# Patient Record
Sex: FEMALE | Race: WHITE | NOT HISPANIC OR LATINO | Employment: OTHER | URBAN - METROPOLITAN AREA
[De-identification: names, ages, dates, MRNs, and addresses within clinical notes are randomized per-mention and may not be internally consistent; named-entity substitution may affect disease eponyms.]

---

## 2023-10-26 PROBLEM — Z90.710 S/P TAH-BSO (TOTAL ABDOMINAL HYSTERECTOMY AND BILATERAL SALPINGO-OOPHORECTOMY): Status: ACTIVE | Noted: 2021-03-24

## 2023-10-26 PROBLEM — E78.00 PURE HYPERCHOLESTEROLEMIA, UNSPECIFIED: Status: ACTIVE | Noted: 2020-02-03

## 2023-10-26 PROBLEM — U07.1 COVID-19: Status: ACTIVE | Noted: 2022-09-16

## 2023-10-26 PROBLEM — K92.1 HEMATOCHEZIA: Status: ACTIVE | Noted: 2023-10-26

## 2023-10-26 PROBLEM — E78.5 HYPERLIPEMIA: Status: ACTIVE | Noted: 2020-02-03

## 2023-10-26 PROBLEM — R82.90 ABNORMAL URINE: Status: ACTIVE | Noted: 2022-03-25

## 2023-10-26 PROBLEM — E86.0 DEHYDRATION: Status: ACTIVE | Noted: 2023-10-26

## 2023-10-26 PROBLEM — R11.2 NAUSEA & VOMITING: Status: ACTIVE | Noted: 2023-10-26

## 2023-10-26 PROBLEM — R50.9 FEVER: Status: ACTIVE | Noted: 2021-10-15

## 2023-10-26 PROBLEM — L65.9 ALOPECIA: Status: ACTIVE | Noted: 2021-03-24

## 2023-10-26 PROBLEM — R31.9 HEMATURIA: Status: ACTIVE | Noted: 2019-11-04

## 2023-10-26 PROBLEM — K76.0 FATTY LIVER: Status: ACTIVE | Noted: 2020-02-03

## 2023-10-26 PROBLEM — E55.9 VITAMIN D DEFICIENCY: Status: ACTIVE | Noted: 2021-03-24

## 2023-10-26 PROBLEM — R10.9 ABDOMINAL PAIN IN FEMALE: Status: ACTIVE | Noted: 2019-11-04

## 2023-10-26 PROBLEM — E87.5 HYPERKALEMIA: Status: ACTIVE | Noted: 2022-04-07

## 2023-10-26 PROBLEM — K52.9 GASTROENTERITIS, ACUTE: Status: ACTIVE | Noted: 2023-10-26

## 2023-10-26 PROBLEM — E83.19: Status: ACTIVE | Noted: 2021-03-24

## 2023-10-26 PROBLEM — Z90.722 S/P TAH-BSO (TOTAL ABDOMINAL HYSTERECTOMY AND BILATERAL SALPINGO-OOPHORECTOMY): Status: ACTIVE | Noted: 2021-03-24

## 2023-10-26 PROBLEM — E04.1 THYROID NODULE: Status: ACTIVE | Noted: 2020-02-03

## 2023-10-26 PROBLEM — E04.9 GOITER: Status: ACTIVE | Noted: 2021-03-24

## 2023-10-26 PROBLEM — R05.9 COUGH: Status: ACTIVE | Noted: 2021-10-15

## 2023-10-26 PROBLEM — K80.10 CALCULUS OF GALLBLADDER WITH CHOLECYSTITIS: Status: ACTIVE | Noted: 2023-10-26

## 2023-10-26 PROBLEM — Q12.0: Status: ACTIVE | Noted: 2021-03-24

## 2023-10-26 PROBLEM — N39.0 URINARY TRACT INFECTION: Status: ACTIVE | Noted: 2021-11-18

## 2023-10-26 PROBLEM — E53.8 COBALAMIN DEFICIENCY: Status: ACTIVE | Noted: 2020-02-03

## 2023-10-26 PROBLEM — Z90.79 S/P TAH-BSO (TOTAL ABDOMINAL HYSTERECTOMY AND BILATERAL SALPINGO-OOPHORECTOMY): Status: ACTIVE | Noted: 2021-03-24

## 2023-10-26 RX ORDER — METHYLPREDNISOLONE 4 MG/1
TABLET ORAL
COMMUNITY
Start: 2016-10-16 | End: 2023-10-30 | Stop reason: ALTCHOICE

## 2023-10-26 RX ORDER — ROSUVASTATIN CALCIUM 5 MG/1
5 TABLET, COATED ORAL DAILY
COMMUNITY
Start: 2023-06-22 | End: 2023-12-21

## 2023-10-26 RX ORDER — DOXYCYCLINE 100 MG/1
100 CAPSULE ORAL EVERY 12 HOURS
COMMUNITY
Start: 2016-10-16 | End: 2023-10-30 | Stop reason: ALTCHOICE

## 2023-10-26 RX ORDER — ONDANSETRON 4 MG/1
4 TABLET, ORALLY DISINTEGRATING ORAL 3 TIMES DAILY PRN
COMMUNITY
Start: 2021-10-16 | End: 2023-10-30 | Stop reason: ALTCHOICE

## 2023-10-26 RX ORDER — COLESEVELAM 180 1/1
1875 TABLET ORAL
COMMUNITY
Start: 2019-11-04

## 2023-10-26 RX ORDER — CEPHALEXIN 500 MG/1
500 CAPSULE ORAL 2 TIMES DAILY
COMMUNITY
End: 2023-10-30 | Stop reason: ALTCHOICE

## 2023-10-30 ENCOUNTER — OFFICE VISIT (OUTPATIENT)
Dept: PRIMARY CARE | Facility: CLINIC | Age: 68
End: 2023-10-30
Payer: MEDICARE

## 2023-10-30 VITALS
DIASTOLIC BLOOD PRESSURE: 86 MMHG | HEIGHT: 64 IN | HEART RATE: 88 BPM | OXYGEN SATURATION: 96 % | SYSTOLIC BLOOD PRESSURE: 142 MMHG | BODY MASS INDEX: 32.44 KG/M2 | WEIGHT: 190 LBS | TEMPERATURE: 97.3 F

## 2023-10-30 DIAGNOSIS — J20.8 ACUTE BRONCHITIS DUE TO OTHER SPECIFIED ORGANISMS: Primary | ICD-10-CM

## 2023-10-30 PROCEDURE — 1159F MED LIST DOCD IN RCRD: CPT | Performed by: NURSE PRACTITIONER

## 2023-10-30 PROCEDURE — 1126F AMNT PAIN NOTED NONE PRSNT: CPT | Performed by: NURSE PRACTITIONER

## 2023-10-30 PROCEDURE — 99213 OFFICE O/P EST LOW 20 MIN: CPT | Performed by: NURSE PRACTITIONER

## 2023-10-30 PROCEDURE — 1036F TOBACCO NON-USER: CPT | Performed by: NURSE PRACTITIONER

## 2023-10-30 RX ORDER — ACETAMINOPHEN 500 MG
5000 TABLET ORAL
COMMUNITY

## 2023-10-30 RX ORDER — AMOXICILLIN AND CLAVULANATE POTASSIUM 875; 125 MG/1; MG/1
875 TABLET, FILM COATED ORAL 2 TIMES DAILY
Qty: 20 TABLET | Refills: 0 | Status: SHIPPED | OUTPATIENT
Start: 2023-10-30 | End: 2023-11-09

## 2023-10-30 RX ORDER — ACETAMINOPHEN, DIPHENHYDRAMINE HCL, PHENYLEPHRINE HCL 325; 25; 5 MG/1; MG/1; MG/1
TABLET ORAL EVERY OTHER DAY
COMMUNITY
End: 2023-10-30 | Stop reason: ALTCHOICE

## 2023-10-30 RX ORDER — BENZONATATE 100 MG/1
100 CAPSULE ORAL 3 TIMES DAILY PRN
Qty: 21 CAPSULE | Refills: 0 | Status: SHIPPED | OUTPATIENT
Start: 2023-10-30 | End: 2023-11-06

## 2023-10-30 RX ORDER — ALBUTEROL SULFATE 90 UG/1
2 AEROSOL, METERED RESPIRATORY (INHALATION) EVERY 6 HOURS PRN
Qty: 8 G | Refills: 5 | Status: SHIPPED | OUTPATIENT
Start: 2023-10-30 | End: 2024-03-28 | Stop reason: ALTCHOICE

## 2023-10-30 ASSESSMENT — PATIENT HEALTH QUESTIONNAIRE - PHQ9
1. LITTLE INTEREST OR PLEASURE IN DOING THINGS: NOT AT ALL
SUM OF ALL RESPONSES TO PHQ9 QUESTIONS 1 AND 2: 0
2. FEELING DOWN, DEPRESSED OR HOPELESS: NOT AT ALL

## 2023-10-30 ASSESSMENT — ENCOUNTER SYMPTOMS
CHILLS: 0
HEMOPTYSIS: 0
SHORTNESS OF BREATH: 0
WHEEZING: 1
SORE THROAT: 0
FEVER: 0
HEADACHES: 0
RHINORRHEA: 0
COUGH: 1
MYALGIAS: 0
SWEATS: 0
WEIGHT LOSS: 0
HEARTBURN: 0

## 2023-10-30 ASSESSMENT — PAIN SCALES - GENERAL: PAINLEVEL: 0-NO PAIN

## 2023-10-30 NOTE — PATIENT INSTRUCTIONS
Thank you for choosing our office for your healthcare needs    Push fluids  Use a humidifier  Salt water gargles    Medications as prescribed    Follow-up in 7 to 10 days if not improving, sooner if worse

## 2023-10-30 NOTE — PROGRESS NOTES
"Subjective   Patient ID: Halley Marino is a 68 y.o. female who presents for Cough (X 1 month) and Nasal Congestion.    HPI   Patient is a 68-year-old female who complains of nasal congestion, postnasal drainage, harsh cough, wheezing that began over 1 month ago  She denies fever, chills, chest pain, shortness of breath, GI complaints, ear pain, sore throat  No contacts ill  Home COVID-19 testing has been negative  Oral intake normal  Using Coricidin cough syrup at night with little relief    Review of Systems  Review of systems negative with exception as above    Objective   /86 (BP Location: Left arm)   Pulse 88   Temp 36.3 °C (97.3 °F) (Temporal)   Ht 1.626 m (5' 4\")   Wt 86.2 kg (190 lb)   SpO2 96%   BMI 32.61 kg/m²     Physical Exam  Alert and oriented x3, no acute distress  Bilateral TMs dull.  Nares red and patent.  Posterior pharynx erythema noted.  No sinus tenderness.  Uvula midline  Neck supple without lymphadenopathy  Heart with regular rate and rhythm  Breath sounds coarse with harsh cough On forced expiration..  No wheezing noted.  Speaks complete sentences  Skin warm and dry without rash  Neuro grossly intact    Assessment/Plan   Diagnoses and all orders for this visit:  Acute bronchitis due to other specified organisms  -     amoxicillin-pot clavulanate (Augmentin) 875-125 mg tablet; Take 1 tablet (875 mg) by mouth 2 times a day for 10 days.  -     benzonatate (Tessalon) 100 mg capsule; Take 1 capsule (100 mg) by mouth 3 times a day as needed for cough for up to 7 days. Do not crush or chew.  -     albuterol (Ventolin HFA) 90 mcg/actuation inhaler; Inhale 2 puffs every 6 hours if needed for wheezing or shortness of breath.  Needs better control  We will treat with antibiotics due to length of symptoms  Albuterol and Tessalon prescriptions as well  Medication education provided  Encourage fluids, rest  Humidifier  OTC as discussed  Follow-up in 7 to 10 days if not improving, " sooner if worse, would need chest x-ray

## 2023-12-20 DIAGNOSIS — E78.5 HYPERLIPIDEMIA, UNSPECIFIED HYPERLIPIDEMIA TYPE: ICD-10-CM

## 2023-12-21 RX ORDER — ROSUVASTATIN CALCIUM 5 MG/1
5 TABLET, COATED ORAL DAILY
Qty: 90 TABLET | Refills: 0 | Status: SHIPPED | OUTPATIENT
Start: 2023-12-21 | End: 2024-03-19

## 2024-03-19 DIAGNOSIS — E78.5 HYPERLIPIDEMIA, UNSPECIFIED HYPERLIPIDEMIA TYPE: ICD-10-CM

## 2024-03-19 RX ORDER — ROSUVASTATIN CALCIUM 5 MG/1
5 TABLET, COATED ORAL DAILY
Qty: 90 TABLET | Refills: 0 | Status: SHIPPED | OUTPATIENT
Start: 2024-03-19 | End: 2024-03-29 | Stop reason: SDUPTHER

## 2024-03-28 ENCOUNTER — OFFICE VISIT (OUTPATIENT)
Dept: PRIMARY CARE | Facility: CLINIC | Age: 69
End: 2024-03-28
Payer: MEDICARE

## 2024-03-28 ENCOUNTER — LAB (OUTPATIENT)
Dept: LAB | Facility: LAB | Age: 69
End: 2024-03-28
Payer: MEDICARE

## 2024-03-28 VITALS
HEIGHT: 64 IN | HEART RATE: 82 BPM | OXYGEN SATURATION: 99 % | BODY MASS INDEX: 31.24 KG/M2 | WEIGHT: 183 LBS | SYSTOLIC BLOOD PRESSURE: 124 MMHG | DIASTOLIC BLOOD PRESSURE: 86 MMHG

## 2024-03-28 DIAGNOSIS — Z00.00 GENERAL MEDICAL EXAM: ICD-10-CM

## 2024-03-28 DIAGNOSIS — E78.00 PURE HYPERCHOLESTEROLEMIA, UNSPECIFIED: ICD-10-CM

## 2024-03-28 DIAGNOSIS — E04.9 GOITER: ICD-10-CM

## 2024-03-28 DIAGNOSIS — E78.49 OTHER HYPERLIPIDEMIA: ICD-10-CM

## 2024-03-28 DIAGNOSIS — E55.9 VITAMIN D DEFICIENCY: ICD-10-CM

## 2024-03-28 DIAGNOSIS — R73.01 IMPAIRED FASTING GLUCOSE: ICD-10-CM

## 2024-03-28 DIAGNOSIS — Z12.31 VISIT FOR SCREENING MAMMOGRAM: ICD-10-CM

## 2024-03-28 DIAGNOSIS — R82.90 ABNORMAL URINE: ICD-10-CM

## 2024-03-28 DIAGNOSIS — Z00.00 ROUTINE GENERAL MEDICAL EXAMINATION AT HEALTH CARE FACILITY: Primary | ICD-10-CM

## 2024-03-28 PROBLEM — E86.0 DEHYDRATION: Status: RESOLVED | Noted: 2023-10-26 | Resolved: 2024-03-28

## 2024-03-28 PROBLEM — R50.9 FEVER: Status: RESOLVED | Noted: 2021-10-15 | Resolved: 2024-03-28

## 2024-03-28 PROBLEM — K52.9 GASTROENTERITIS, ACUTE: Status: RESOLVED | Noted: 2023-10-26 | Resolved: 2024-03-28

## 2024-03-28 PROBLEM — R11.2 NAUSEA & VOMITING: Status: RESOLVED | Noted: 2023-10-26 | Resolved: 2024-03-28

## 2024-03-28 PROBLEM — N39.0 URINARY TRACT INFECTION: Status: RESOLVED | Noted: 2021-11-18 | Resolved: 2024-03-28

## 2024-03-28 PROBLEM — U07.1 COVID-19: Status: RESOLVED | Noted: 2022-09-16 | Resolved: 2024-03-28

## 2024-03-28 LAB
25(OH)D3 SERPL-MCNC: 53 NG/ML (ref 31–100)
ALBUMIN SERPL-MCNC: 4.8 G/DL (ref 3.5–5)
ALP BLD-CCNC: 78 U/L (ref 35–125)
ALT SERPL-CCNC: 48 U/L (ref 5–40)
ANION GAP SERPL CALC-SCNC: 12 MMOL/L
AST SERPL-CCNC: 38 U/L (ref 5–40)
BASOPHILS # BLD AUTO: 0.11 X10*3/UL (ref 0–0.1)
BASOPHILS NFR BLD AUTO: 1.4 %
BILIRUB SERPL-MCNC: 0.5 MG/DL (ref 0.1–1.2)
BUN SERPL-MCNC: 15 MG/DL (ref 8–25)
CALCIUM SERPL-MCNC: 9.7 MG/DL (ref 8.5–10.4)
CHLORIDE SERPL-SCNC: 103 MMOL/L (ref 97–107)
CHOLEST SERPL-MCNC: 137 MG/DL (ref 133–200)
CHOLEST/HDLC SERPL: 2.3 {RATIO}
CO2 SERPL-SCNC: 27 MMOL/L (ref 24–31)
CREAT SERPL-MCNC: 1 MG/DL (ref 0.4–1.6)
EGFRCR SERPLBLD CKD-EPI 2021: 62 ML/MIN/1.73M*2
EOSINOPHIL # BLD AUTO: 0.12 X10*3/UL (ref 0–0.7)
EOSINOPHIL NFR BLD AUTO: 1.6 %
ERYTHROCYTE [DISTWIDTH] IN BLOOD BY AUTOMATED COUNT: 12.1 % (ref 11.5–14.5)
EST. AVERAGE GLUCOSE BLD GHB EST-MCNC: 105 MG/DL
GLUCOSE SERPL-MCNC: 107 MG/DL (ref 65–99)
HBA1C MFR BLD: 5.3 %
HCT VFR BLD AUTO: 45.7 % (ref 36–46)
HDLC SERPL-MCNC: 60 MG/DL
HGB BLD-MCNC: 15.8 G/DL (ref 12–16)
IMM GRANULOCYTES # BLD AUTO: 0.02 X10*3/UL (ref 0–0.7)
IMM GRANULOCYTES NFR BLD AUTO: 0.3 % (ref 0–0.9)
LDLC SERPL CALC-MCNC: 39 MG/DL (ref 65–130)
LYMPHOCYTES # BLD AUTO: 2.39 X10*3/UL (ref 1.2–4.8)
LYMPHOCYTES NFR BLD AUTO: 31 %
MCH RBC QN AUTO: 31.4 PG (ref 26–34)
MCHC RBC AUTO-ENTMCNC: 34.6 G/DL (ref 32–36)
MCV RBC AUTO: 91 FL (ref 80–100)
MONOCYTES # BLD AUTO: 0.63 X10*3/UL (ref 0.1–1)
MONOCYTES NFR BLD AUTO: 8.2 %
NEUTROPHILS # BLD AUTO: 4.44 X10*3/UL (ref 1.2–7.7)
NEUTROPHILS NFR BLD AUTO: 57.5 %
NRBC BLD-RTO: 0 /100 WBCS (ref 0–0)
PLATELET # BLD AUTO: 216 X10*3/UL (ref 150–450)
POC APPEARANCE, URINE: CLEAR
POC BILIRUBIN, URINE: NEGATIVE
POC BLOOD, URINE: ABNORMAL
POC COLOR, URINE: YELLOW
POC GLUCOSE, URINE: NEGATIVE MG/DL
POC KETONES, URINE: NEGATIVE MG/DL
POC LEUKOCYTES, URINE: ABNORMAL
POC NITRITE,URINE: POSITIVE
POC PH, URINE: 5 PH
POC PROTEIN, URINE: NEGATIVE MG/DL
POC SPECIFIC GRAVITY, URINE: 1.02
POC UROBILINOGEN, URINE: 0.2 EU/DL
POTASSIUM SERPL-SCNC: 4.8 MMOL/L (ref 3.4–5.1)
PROT SERPL-MCNC: 7.3 G/DL (ref 5.9–7.9)
RBC # BLD AUTO: 5.03 X10*6/UL (ref 4–5.2)
SODIUM SERPL-SCNC: 142 MMOL/L (ref 133–145)
TRIGL SERPL-MCNC: 191 MG/DL (ref 40–150)
TSH SERPL DL<=0.05 MIU/L-ACNC: 1.85 MIU/L (ref 0.27–4.2)
WBC # BLD AUTO: 7.7 X10*3/UL (ref 4.4–11.3)

## 2024-03-28 PROCEDURE — 1036F TOBACCO NON-USER: CPT | Performed by: PHYSICIAN ASSISTANT

## 2024-03-28 PROCEDURE — 80061 LIPID PANEL: CPT

## 2024-03-28 PROCEDURE — 82306 VITAMIN D 25 HYDROXY: CPT

## 2024-03-28 PROCEDURE — 1126F AMNT PAIN NOTED NONE PRSNT: CPT | Performed by: PHYSICIAN ASSISTANT

## 2024-03-28 PROCEDURE — 1158F ADVNC CARE PLAN TLK DOCD: CPT | Performed by: PHYSICIAN ASSISTANT

## 2024-03-28 PROCEDURE — 84443 ASSAY THYROID STIM HORMONE: CPT

## 2024-03-28 PROCEDURE — G0439 PPPS, SUBSEQ VISIT: HCPCS | Performed by: PHYSICIAN ASSISTANT

## 2024-03-28 PROCEDURE — 93000 ELECTROCARDIOGRAM COMPLETE: CPT | Performed by: PHYSICIAN ASSISTANT

## 2024-03-28 PROCEDURE — 1170F FXNL STATUS ASSESSED: CPT | Performed by: PHYSICIAN ASSISTANT

## 2024-03-28 PROCEDURE — 1159F MED LIST DOCD IN RCRD: CPT | Performed by: PHYSICIAN ASSISTANT

## 2024-03-28 PROCEDURE — 1123F ACP DISCUSS/DSCN MKR DOCD: CPT | Performed by: PHYSICIAN ASSISTANT

## 2024-03-28 PROCEDURE — 80053 COMPREHEN METABOLIC PANEL: CPT

## 2024-03-28 PROCEDURE — 87186 SC STD MICRODIL/AGAR DIL: CPT

## 2024-03-28 PROCEDURE — 1160F RVW MEDS BY RX/DR IN RCRD: CPT | Performed by: PHYSICIAN ASSISTANT

## 2024-03-28 PROCEDURE — 85025 COMPLETE CBC W/AUTO DIFF WBC: CPT

## 2024-03-28 PROCEDURE — 36415 COLL VENOUS BLD VENIPUNCTURE: CPT

## 2024-03-28 PROCEDURE — 81003 URINALYSIS AUTO W/O SCOPE: CPT | Performed by: PHYSICIAN ASSISTANT

## 2024-03-28 PROCEDURE — 87086 URINE CULTURE/COLONY COUNT: CPT

## 2024-03-28 PROCEDURE — 83036 HEMOGLOBIN GLYCOSYLATED A1C: CPT

## 2024-03-28 RX ORDER — NITROFURANTOIN 25; 75 MG/1; MG/1
100 CAPSULE ORAL 2 TIMES DAILY
Qty: 10 CAPSULE | Refills: 0 | Status: SHIPPED | OUTPATIENT
Start: 2024-03-28 | End: 2024-04-02

## 2024-03-28 ASSESSMENT — ENCOUNTER SYMPTOMS
NAUSEA: 0
CONSTIPATION: 0
BLOOD IN STOOL: 0
ABDOMINAL PAIN: 0
DIARRHEA: 0
NERVOUS/ANXIOUS: 0
TREMORS: 0
APPETITE CHANGE: 1
DIZZINESS: 0
COLOR CHANGE: 0
SLEEP DISTURBANCE: 0
WHEEZING: 0
MYALGIAS: 0
FREQUENCY: 0
LIGHT-HEADEDNESS: 0
ARTHRALGIAS: 0
SHORTNESS OF BREATH: 0
ACTIVITY CHANGE: 0
PALPITATIONS: 0
CHEST TIGHTNESS: 0
FATIGUE: 1

## 2024-03-28 ASSESSMENT — ACTIVITIES OF DAILY LIVING (ADL)
DRESSING: INDEPENDENT
DOING_HOUSEWORK: INDEPENDENT
GROCERY_SHOPPING: INDEPENDENT
TAKING_MEDICATION: INDEPENDENT
BATHING: INDEPENDENT
MANAGING_FINANCES: INDEPENDENT

## 2024-03-28 ASSESSMENT — PATIENT HEALTH QUESTIONNAIRE - PHQ9
1. LITTLE INTEREST OR PLEASURE IN DOING THINGS: NOT AT ALL
2. FEELING DOWN, DEPRESSED OR HOPELESS: NOT AT ALL
SUM OF ALL RESPONSES TO PHQ9 QUESTIONS 1 AND 2: 0

## 2024-03-28 ASSESSMENT — PAIN SCALES - GENERAL: PAINLEVEL: 0-NO PAIN

## 2024-03-28 NOTE — PROGRESS NOTES
"Subjective   Reason for Visit: Halley Marino is an 68 y.o. female here for a Medicare Wellness visit.     Past Medical, Surgical, and Family History reviewed and updated in chart.    Reviewed all medications by prescribing practitioner or clinical pharmacist (such as prescriptions, OTCs, herbal therapies and supplements) and documented in the medical record.    HPI  Patient is now retired and doing pretty well.  She admits she has not been exercising and is somewhat watching her diet.  She is working on losing weight.  Patient Care Team:  Nel Graff PA-C as PCP - General (Family Medicine)     Review of Systems   Constitutional:  Positive for appetite change and fatigue. Negative for activity change.   HENT:  Negative for dental problem.    Eyes:  Negative for visual disturbance.   Respiratory:  Negative for chest tightness, shortness of breath and wheezing.    Cardiovascular:  Negative for chest pain, palpitations and leg swelling.   Gastrointestinal:  Negative for abdominal pain, blood in stool, constipation, diarrhea and nausea.   Endocrine: Negative for cold intolerance and heat intolerance.   Genitourinary:  Negative for frequency and urgency.   Musculoskeletal:  Negative for arthralgias and myalgias.   Skin:  Negative for color change.   Allergic/Immunologic: Negative for immunocompromised state.   Neurological:  Negative for dizziness, tremors and light-headedness.   Psychiatric/Behavioral:  Negative for behavioral problems and sleep disturbance. The patient is not nervous/anxious.        Objective   Vitals:  /86   Pulse 82   Ht 1.632 m (5' 4.25\")   Wt 83 kg (183 lb)   SpO2 99%   BMI 31.17 kg/m²       Physical Exam  Constitutional:       Appearance: She is obese.   HENT:      Right Ear: Tympanic membrane normal.      Left Ear: Tympanic membrane normal.      Nose: Nose normal.      Mouth/Throat:      Mouth: Mucous membranes are moist.   Neck:      Thyroid: Thyromegaly present. No " thyroid tenderness.   Cardiovascular:      Rate and Rhythm: Normal rate and regular rhythm.      Pulses: Normal pulses.   Pulmonary:      Effort: Pulmonary effort is normal. No respiratory distress.   Chest:   Breasts:     Right: No swelling, bleeding or nipple discharge.      Left: No swelling, bleeding or nipple discharge.   Abdominal:      General: Bowel sounds are normal.      Tenderness: There is no abdominal tenderness.   Musculoskeletal:      Cervical back: Normal range of motion. No tenderness.      Right lower leg: No edema.      Left lower leg: No edema.   Skin:     General: Skin is warm.   Neurological:      General: No focal deficit present.      Mental Status: She is alert. Mental status is at baseline.   Psychiatric:         Mood and Affect: Mood normal.         Thought Content: Thought content normal.         Judgment: Judgment normal.         Assessment/Plan   Problem List Items Addressed This Visit       Vitamin D deficiency    Relevant Orders    Vitamin D 25-Hydroxy,Total (for eval of Vitamin D levels)    Pure hypercholesterolemia, unspecified    Relevant Orders    CBC and Auto Differential    Comprehensive Metabolic Panel    Hemoglobin A1C    Lipid Panel    TSH with reflex to Free T4 if abnormal    ECG 12 lead (Clinic Performed) (Completed)    CT cardiac scoring wo IV contrast    Hyperlipemia    Goiter    Relevant Orders    TSH with reflex to Free T4 if abnormal     Other Visit Diagnoses       Routine general medical examination at health care facility    -  Primary    General medical exam        Relevant Orders    POCT UA Automated manually resulted (Completed)    Visit for screening mammogram        Relevant Orders    BI mammo bilateral screening tomosynthesis    Impaired fasting glucose        Relevant Orders    Hemoglobin A1C    Abnormal urine        Relevant Medications    nitrofurantoin, macrocrystal-monohydrate, (Macrobid) 100 mg capsule    Other Relevant Orders    Urine Culture        Her  urine was off today.  She states that has been smoking a little funny.  Will treat her for UTI and follow-up pending her culture results and progress.  Encouraged to continue monitoring her diet.  She will continue her WelChol for her IBS and her Crestor for hyperlipidemia.

## 2024-03-29 DIAGNOSIS — E78.5 HYPERLIPIDEMIA, UNSPECIFIED HYPERLIPIDEMIA TYPE: ICD-10-CM

## 2024-03-29 RX ORDER — ROSUVASTATIN CALCIUM 5 MG/1
5 TABLET, COATED ORAL DAILY
Qty: 90 TABLET | Refills: 3 | Status: SHIPPED | OUTPATIENT
Start: 2024-03-29

## 2024-03-29 NOTE — TELEPHONE ENCOUNTER
Patient is calling in for a refill on Rosuvastatin, seen yesterday for CPE and she forgot to mention that she needs 1 year refills  sent to [facility] in [place].

## 2024-03-31 LAB — BACTERIA UR CULT: ABNORMAL

## 2024-04-29 ENCOUNTER — HOSPITAL ENCOUNTER (OUTPATIENT)
Dept: RADIOLOGY | Facility: HOSPITAL | Age: 69
Discharge: HOME | End: 2024-04-29
Payer: MEDICARE

## 2024-04-29 VITALS — BODY MASS INDEX: 31.76 KG/M2 | HEIGHT: 64 IN | WEIGHT: 186 LBS

## 2024-04-29 DIAGNOSIS — Z12.31 VISIT FOR SCREENING MAMMOGRAM: ICD-10-CM

## 2024-04-29 PROCEDURE — 77067 SCR MAMMO BI INCL CAD: CPT

## 2024-04-29 PROCEDURE — 77067 SCR MAMMO BI INCL CAD: CPT | Performed by: RADIOLOGY

## 2024-04-29 PROCEDURE — 77063 BREAST TOMOSYNTHESIS BI: CPT | Performed by: RADIOLOGY

## 2024-06-19 ENCOUNTER — HOSPITAL ENCOUNTER (OUTPATIENT)
Dept: RADIOLOGY | Facility: CLINIC | Age: 69
Discharge: HOME | End: 2024-06-19
Payer: MEDICARE

## 2024-06-19 DIAGNOSIS — E78.00 PURE HYPERCHOLESTEROLEMIA, UNSPECIFIED: ICD-10-CM

## 2024-06-19 PROCEDURE — 75571 CT HRT W/O DYE W/CA TEST: CPT

## 2024-07-30 ENCOUNTER — TELEPHONE (OUTPATIENT)
Dept: PRIMARY CARE | Facility: CLINIC | Age: 69
End: 2024-07-30
Payer: MEDICARE

## 2024-07-30 NOTE — TELEPHONE ENCOUNTER
Patient spouse Wilver would like to know if patient Qualifies for medication. Patient dropped off a list of medication and tax information a week ago.    Wilver can be reached at [phone]

## 2024-07-30 NOTE — TELEPHONE ENCOUNTER
Called and left message for patient regarding  Meds assistance program, letting her know that she doesn't take medications that are covered.

## 2024-12-10 ENCOUNTER — TELEPHONE (OUTPATIENT)
Dept: PRIMARY CARE | Facility: CLINIC | Age: 69
End: 2024-12-10
Payer: MEDICARE

## 2025-03-31 ASSESSMENT — PROMIS GLOBAL HEALTH SCALE
RATE_AVERAGE_PAIN: 2
CARRYOUT_SOCIAL_ACTIVITIES: VERY GOOD
RATE_QUALITY_OF_LIFE: VERY GOOD
RATE_PHYSICAL_HEALTH: VERY GOOD
RATE_SOCIAL_SATISFACTION: VERY GOOD
EMOTIONAL_PROBLEMS: NEVER
CARRYOUT_PHYSICAL_ACTIVITIES: COMPLETELY
RATE_AVERAGE_FATIGUE: MILD
RATE_MENTAL_HEALTH: VERY GOOD
RATE_GENERAL_HEALTH: VERY GOOD

## 2025-04-01 ENCOUNTER — APPOINTMENT (OUTPATIENT)
Dept: PRIMARY CARE | Facility: CLINIC | Age: 70
End: 2025-04-01
Payer: MEDICARE

## 2025-04-01 VITALS
SYSTOLIC BLOOD PRESSURE: 138 MMHG | DIASTOLIC BLOOD PRESSURE: 88 MMHG | HEIGHT: 64 IN | WEIGHT: 186 LBS | HEART RATE: 81 BPM | BODY MASS INDEX: 31.76 KG/M2 | OXYGEN SATURATION: 95 %

## 2025-04-01 DIAGNOSIS — Z13.820 ENCOUNTER FOR OSTEOPOROSIS SCREENING IN ASYMPTOMATIC POSTMENOPAUSAL PATIENT: ICD-10-CM

## 2025-04-01 DIAGNOSIS — Z12.31 VISIT FOR SCREENING MAMMOGRAM: ICD-10-CM

## 2025-04-01 DIAGNOSIS — E04.9 GOITER: ICD-10-CM

## 2025-04-01 DIAGNOSIS — R73.01 IMPAIRED FASTING GLUCOSE: ICD-10-CM

## 2025-04-01 DIAGNOSIS — E78.00 PURE HYPERCHOLESTEROLEMIA, UNSPECIFIED: ICD-10-CM

## 2025-04-01 DIAGNOSIS — Z00.00 GENERAL MEDICAL EXAM: ICD-10-CM

## 2025-04-01 DIAGNOSIS — Z00.00 ROUTINE GENERAL MEDICAL EXAMINATION AT HEALTH CARE FACILITY: Primary | ICD-10-CM

## 2025-04-01 DIAGNOSIS — Z78.0 ENCOUNTER FOR OSTEOPOROSIS SCREENING IN ASYMPTOMATIC POSTMENOPAUSAL PATIENT: ICD-10-CM

## 2025-04-01 DIAGNOSIS — E78.49 OTHER HYPERLIPIDEMIA: ICD-10-CM

## 2025-04-01 DIAGNOSIS — E78.5 HYPERLIPIDEMIA, UNSPECIFIED HYPERLIPIDEMIA TYPE: ICD-10-CM

## 2025-04-01 LAB
POC APPEARANCE, URINE: CLEAR
POC BILIRUBIN, URINE: NEGATIVE
POC BLOOD, URINE: ABNORMAL
POC COLOR, URINE: YELLOW
POC GLUCOSE, URINE: NEGATIVE MG/DL
POC KETONES, URINE: NEGATIVE MG/DL
POC LEUKOCYTES, URINE: ABNORMAL
POC NITRITE,URINE: NEGATIVE
POC PH, URINE: 5.5 PH
POC PROTEIN, URINE: NEGATIVE MG/DL
POC SPECIFIC GRAVITY, URINE: 1.02
POC UROBILINOGEN, URINE: 0.2 EU/DL

## 2025-04-01 PROCEDURE — 1158F ADVNC CARE PLAN TLK DOCD: CPT | Performed by: PHYSICIAN ASSISTANT

## 2025-04-01 PROCEDURE — 1123F ACP DISCUSS/DSCN MKR DOCD: CPT | Performed by: PHYSICIAN ASSISTANT

## 2025-04-01 PROCEDURE — 3008F BODY MASS INDEX DOCD: CPT | Performed by: PHYSICIAN ASSISTANT

## 2025-04-01 PROCEDURE — 1160F RVW MEDS BY RX/DR IN RCRD: CPT | Performed by: PHYSICIAN ASSISTANT

## 2025-04-01 PROCEDURE — 1159F MED LIST DOCD IN RCRD: CPT | Performed by: PHYSICIAN ASSISTANT

## 2025-04-01 PROCEDURE — 1170F FXNL STATUS ASSESSED: CPT | Performed by: PHYSICIAN ASSISTANT

## 2025-04-01 PROCEDURE — 81003 URINALYSIS AUTO W/O SCOPE: CPT | Performed by: PHYSICIAN ASSISTANT

## 2025-04-01 PROCEDURE — G0439 PPPS, SUBSEQ VISIT: HCPCS | Performed by: PHYSICIAN ASSISTANT

## 2025-04-01 PROCEDURE — 1126F AMNT PAIN NOTED NONE PRSNT: CPT | Performed by: PHYSICIAN ASSISTANT

## 2025-04-01 RX ORDER — ROSUVASTATIN CALCIUM 5 MG/1
5 TABLET, COATED ORAL DAILY
Qty: 90 TABLET | Refills: 3 | Status: SHIPPED | OUTPATIENT
Start: 2025-04-01

## 2025-04-01 ASSESSMENT — ENCOUNTER SYMPTOMS
DIARRHEA: 0
CONSTIPATION: 0
ARTHRALGIAS: 0
CHEST TIGHTNESS: 0
PALPITATIONS: 0
NERVOUS/ANXIOUS: 0
LIGHT-HEADEDNESS: 0
BLOOD IN STOOL: 0
WHEEZING: 0
FREQUENCY: 0
SHORTNESS OF BREATH: 0
APPETITE CHANGE: 0
MYALGIAS: 0
SLEEP DISTURBANCE: 0
NAUSEA: 0
ACTIVITY CHANGE: 0
TREMORS: 0
COLOR CHANGE: 0
ABDOMINAL PAIN: 0
DIZZINESS: 0

## 2025-04-01 ASSESSMENT — PAIN SCALES - GENERAL: PAINLEVEL_OUTOF10: 0-NO PAIN

## 2025-04-01 ASSESSMENT — PATIENT HEALTH QUESTIONNAIRE - PHQ9
SUM OF ALL RESPONSES TO PHQ9 QUESTIONS 1 AND 2: 0
2. FEELING DOWN, DEPRESSED OR HOPELESS: NOT AT ALL
1. LITTLE INTEREST OR PLEASURE IN DOING THINGS: NOT AT ALL

## 2025-04-01 ASSESSMENT — ACTIVITIES OF DAILY LIVING (ADL)
DRESSING: INDEPENDENT
DOING_HOUSEWORK: INDEPENDENT
TAKING_MEDICATION: INDEPENDENT
BATHING: INDEPENDENT
MANAGING_FINANCES: INDEPENDENT
GROCERY_SHOPPING: INDEPENDENT

## 2025-04-01 NOTE — ASSESSMENT & PLAN NOTE
Discussed diet and exercise, taking calcium and vitamin D.  Orders:    Comprehensive Metabolic Panel; Future    CBC and Auto Differential; Future    Lipid Panel; Future

## 2025-04-01 NOTE — PROGRESS NOTES
"Subjective   Reason for Visit: Halley Marino is an 69 y.o. female here for a Medicare Wellness visit.     Past Medical, Surgical, and Family History reviewed and updated in chart.         Hyperlipidemia  This is a chronic problem. The current episode started more than 1 year ago. The problem is controlled. Pertinent negatives include no chest pain, myalgias or shortness of breath.       Patient Care Team:  Nel Graff PA-C as PCP - General (Family Medicine)     Review of Systems   Constitutional:  Positive for fatigue. Negative for activity change and appetite change.   HENT:  Negative for dental problem.    Eyes:  Negative for visual disturbance.   Respiratory:  Negative for chest tightness, shortness of breath and wheezing.    Cardiovascular:  Negative for chest pain, palpitations and leg swelling.   Gastrointestinal:  Negative for abdominal pain, blood in stool, constipation, diarrhea and nausea.   Endocrine: Negative for cold intolerance and heat intolerance.   Genitourinary:  Negative for frequency and urgency.   Musculoskeletal:  Negative for arthralgias and myalgias.   Skin:  Negative for color change.   Allergic/Immunologic: Negative for immunocompromised state.   Neurological:  Negative for dizziness, tremors and light-headedness.   Psychiatric/Behavioral:  Negative for behavioral problems and sleep disturbance. The patient is not nervous/anxious.        Objective   Vitals:  /88 (BP Location: Left arm)   Pulse 81   Ht 1.626 m (5' 4\")   Wt 84.4 kg (186 lb)   SpO2 95%   BMI 31.93 kg/m²       Physical Exam  HENT:      Right Ear: Tympanic membrane normal.      Left Ear: Tympanic membrane normal.      Nose: Nose normal.      Mouth/Throat:      Mouth: Mucous membranes are moist.   Neck:      Thyroid: Thyromegaly present.   Cardiovascular:      Rate and Rhythm: Normal rate and regular rhythm.      Pulses: Normal pulses.   Pulmonary:      Effort: Pulmonary effort is normal. No " respiratory distress.   Abdominal:      General: Bowel sounds are normal.      Tenderness: There is no abdominal tenderness.   Musculoskeletal:      Cervical back: Normal range of motion. No tenderness.      Right lower leg: No edema.      Left lower leg: No edema.   Skin:     General: Skin is warm.   Neurological:      General: No focal deficit present.      Mental Status: She is alert. Mental status is at baseline.   Psychiatric:         Mood and Affect: Mood normal.         Thought Content: Thought content normal.         Judgment: Judgment normal.         Assessment & Plan  Pure hypercholesterolemia, unspecified    Orders:    POCT UA Automated manually resulted    Other hyperlipidemia  Discussed diet and exercise, taking calcium and vitamin D.  Orders:    Comprehensive Metabolic Panel; Future    CBC and Auto Differential; Future    Lipid Panel; Future    General medical exam         Visit for screening mammogram    Orders:    BI mammo bilateral screening tomosynthesis; Future    Routine general medical examination at health care facility    Orders:    CBC and Auto Differential; Future    1 Year Follow Up In Primary Care - Wellness Exam; Future    Impaired fasting glucose    Orders:    Comprehensive Metabolic Panel; Future    Hemoglobin A1C; Future    Encounter for osteoporosis screening in asymptomatic postmenopausal patient    Orders:    XR DEXA bone density; Future    Goiter  Repeat US. Has been a few years since her last one.  Orders:    CBC and Auto Differential; Future    US thyroid; Future    Hyperlipidemia, unspecified hyperlipidemia type    Orders:    rosuvastatin (Crestor) 5 mg tablet; Take 1 tablet (5 mg) by mouth once daily.

## 2025-04-01 NOTE — ASSESSMENT & PLAN NOTE
Repeat US. Has been a few years since her last one.  Orders:    CBC and Auto Differential; Future    US thyroid; Future

## 2025-04-02 LAB
ALBUMIN SERPL-MCNC: 4.7 G/DL (ref 3.6–5.1)
ALP SERPL-CCNC: 61 U/L (ref 37–153)
ALT SERPL-CCNC: 42 U/L (ref 6–29)
ANION GAP SERPL CALCULATED.4IONS-SCNC: 9 MMOL/L (CALC) (ref 7–17)
AST SERPL-CCNC: 43 U/L (ref 10–35)
BASOPHILS # BLD AUTO: 67 CELLS/UL (ref 0–200)
BASOPHILS NFR BLD AUTO: 1.2 %
BILIRUB SERPL-MCNC: 0.7 MG/DL (ref 0.2–1.2)
BUN SERPL-MCNC: 16 MG/DL (ref 7–25)
CALCIUM SERPL-MCNC: 9.7 MG/DL (ref 8.6–10.4)
CHLORIDE SERPL-SCNC: 105 MMOL/L (ref 98–110)
CHOLEST SERPL-MCNC: 127 MG/DL
CHOLEST/HDLC SERPL: 2.3 (CALC)
CO2 SERPL-SCNC: 29 MMOL/L (ref 20–32)
CREAT SERPL-MCNC: 0.83 MG/DL (ref 0.5–1.05)
EGFRCR SERPLBLD CKD-EPI 2021: 77 ML/MIN/1.73M2
EOSINOPHIL # BLD AUTO: 118 CELLS/UL (ref 15–500)
EOSINOPHIL NFR BLD AUTO: 2.1 %
ERYTHROCYTE [DISTWIDTH] IN BLOOD BY AUTOMATED COUNT: 12.1 % (ref 11–15)
EST. AVERAGE GLUCOSE BLD GHB EST-MCNC: 108 MG/DL
EST. AVERAGE GLUCOSE BLD GHB EST-SCNC: 6 MMOL/L
GLUCOSE SERPL-MCNC: 107 MG/DL (ref 65–99)
HBA1C MFR BLD: 5.4 % OF TOTAL HGB
HCT VFR BLD AUTO: 45.7 % (ref 35–45)
HDLC SERPL-MCNC: 56 MG/DL
HGB BLD-MCNC: 15.5 G/DL (ref 11.7–15.5)
LDLC SERPL CALC-MCNC: 43 MG/DL (CALC)
LYMPHOCYTES # BLD AUTO: 1786 CELLS/UL (ref 850–3900)
LYMPHOCYTES NFR BLD AUTO: 31.9 %
MCH RBC QN AUTO: 32.2 PG (ref 27–33)
MCHC RBC AUTO-ENTMCNC: 33.9 G/DL (ref 32–36)
MCV RBC AUTO: 94.8 FL (ref 80–100)
MONOCYTES # BLD AUTO: 398 CELLS/UL (ref 200–950)
MONOCYTES NFR BLD AUTO: 7.1 %
NEUTROPHILS # BLD AUTO: 3231 CELLS/UL (ref 1500–7800)
NEUTROPHILS NFR BLD AUTO: 57.7 %
NONHDLC SERPL-MCNC: 71 MG/DL (CALC)
PLATELET # BLD AUTO: 198 THOUSAND/UL (ref 140–400)
PMV BLD REES-ECKER: 10.4 FL (ref 7.5–12.5)
POTASSIUM SERPL-SCNC: 4.4 MMOL/L (ref 3.5–5.3)
PROT SERPL-MCNC: 7.4 G/DL (ref 6.1–8.1)
RBC # BLD AUTO: 4.82 MILLION/UL (ref 3.8–5.1)
SODIUM SERPL-SCNC: 143 MMOL/L (ref 135–146)
TRIGL SERPL-MCNC: 201 MG/DL
WBC # BLD AUTO: 5.6 THOUSAND/UL (ref 3.8–10.8)

## 2025-04-02 ASSESSMENT — ENCOUNTER SYMPTOMS: FATIGUE: 1

## 2025-04-04 ENCOUNTER — HOSPITAL ENCOUNTER (OUTPATIENT)
Dept: RADIOLOGY | Facility: CLINIC | Age: 70
Discharge: HOME | End: 2025-04-04
Payer: MEDICARE

## 2025-04-04 DIAGNOSIS — E04.9 GOITER: ICD-10-CM

## 2025-04-04 PROCEDURE — 76536 US EXAM OF HEAD AND NECK: CPT

## 2025-05-02 ENCOUNTER — HOSPITAL ENCOUNTER (OUTPATIENT)
Dept: RADIOLOGY | Facility: CLINIC | Age: 70
Discharge: HOME | End: 2025-05-02
Payer: MEDICARE

## 2025-05-02 VITALS — BODY MASS INDEX: 31.93 KG/M2 | WEIGHT: 186 LBS

## 2025-05-02 DIAGNOSIS — Z13.820 ENCOUNTER FOR OSTEOPOROSIS SCREENING IN ASYMPTOMATIC POSTMENOPAUSAL PATIENT: ICD-10-CM

## 2025-05-02 DIAGNOSIS — Z12.31 VISIT FOR SCREENING MAMMOGRAM: ICD-10-CM

## 2025-05-02 DIAGNOSIS — Z78.0 ENCOUNTER FOR OSTEOPOROSIS SCREENING IN ASYMPTOMATIC POSTMENOPAUSAL PATIENT: ICD-10-CM

## 2025-05-02 PROCEDURE — 77080 DXA BONE DENSITY AXIAL: CPT

## 2025-05-02 PROCEDURE — 77067 SCR MAMMO BI INCL CAD: CPT

## 2025-06-15 DIAGNOSIS — E78.5 HYPERLIPIDEMIA, UNSPECIFIED HYPERLIPIDEMIA TYPE: ICD-10-CM

## 2025-06-16 RX ORDER — ROSUVASTATIN CALCIUM 5 MG/1
5 TABLET, COATED ORAL DAILY
Qty: 90 TABLET | Refills: 3 | Status: SHIPPED | OUTPATIENT
Start: 2025-06-16

## 2026-04-02 ENCOUNTER — APPOINTMENT (OUTPATIENT)
Dept: PRIMARY CARE | Facility: CLINIC | Age: 71
End: 2026-04-02
Payer: MEDICARE